# Patient Record
Sex: MALE | Race: OTHER | HISPANIC OR LATINO | URBAN - METROPOLITAN AREA
[De-identification: names, ages, dates, MRNs, and addresses within clinical notes are randomized per-mention and may not be internally consistent; named-entity substitution may affect disease eponyms.]

---

## 2019-08-13 ENCOUNTER — EMERGENCY (EMERGENCY)
Facility: HOSPITAL | Age: 29
LOS: 1 days | Discharge: HOME | End: 2019-08-13
Attending: EMERGENCY MEDICINE
Payer: COMMERCIAL

## 2019-08-13 VITALS
SYSTOLIC BLOOD PRESSURE: 123 MMHG | HEART RATE: 85 BPM | RESPIRATION RATE: 16 BRPM | OXYGEN SATURATION: 96 % | DIASTOLIC BLOOD PRESSURE: 76 MMHG

## 2019-08-13 VITALS
WEIGHT: 300.05 LBS | HEIGHT: 76 IN | HEART RATE: 78 BPM | OXYGEN SATURATION: 96 % | RESPIRATION RATE: 19 BRPM | TEMPERATURE: 98 F | DIASTOLIC BLOOD PRESSURE: 71 MMHG | SYSTOLIC BLOOD PRESSURE: 108 MMHG

## 2019-08-13 PROCEDURE — 99284 EMERGENCY DEPT VISIT MOD MDM: CPT | Mod: 25

## 2019-08-13 PROCEDURE — 96374 THER/PROPH/DIAG INJ IV PUSH: CPT | Mod: XU

## 2019-08-13 PROCEDURE — 73030 X-RAY EXAM OF SHOULDER: CPT | Mod: 26,RT,76

## 2019-08-13 PROCEDURE — 23650 CLTX SHO DSLC W/MNPJ WO ANES: CPT | Mod: 54

## 2019-08-13 PROCEDURE — 73030 X-RAY EXAM OF SHOULDER: CPT

## 2019-08-13 PROCEDURE — 96375 TX/PRO/DX INJ NEW DRUG ADDON: CPT | Mod: XU

## 2019-08-13 PROCEDURE — 23650 CLTX SHO DSLC W/MNPJ WO ANES: CPT | Mod: RT

## 2019-08-13 PROCEDURE — 99053 MED SERV 10PM-8AM 24 HR FAC: CPT

## 2019-08-13 RX ORDER — KETOROLAC TROMETHAMINE 30 MG/ML
15 SYRINGE (ML) INJECTION ONCE
Refills: 0 | Status: DISCONTINUED | OUTPATIENT
Start: 2019-08-13 | End: 2019-08-13

## 2019-08-13 RX ORDER — FENTANYL CITRATE 50 UG/ML
50 INJECTION INTRAVENOUS ONCE
Refills: 0 | Status: DISCONTINUED | OUTPATIENT
Start: 2019-08-13 | End: 2019-08-13

## 2019-08-13 RX ADMIN — FENTANYL CITRATE 50 MICROGRAM(S): 50 INJECTION INTRAVENOUS at 06:07

## 2019-08-13 RX ADMIN — Medication 15 MILLIGRAM(S): at 06:06

## 2019-08-13 NOTE — ED PROVIDER NOTE - NSFOLLOWUPCLINICS_GEN_ALL_ED_FT
[facility] Orthopedic Surgery  Orthopedic Surgery  [address], 3rd & 4th [place] [address]  Phone: [phone]  Fax:   Follow Up Time:

## 2019-08-13 NOTE — ED PROVIDER NOTE - CLINICAL SUMMARY MEDICAL DECISION MAKING FREE TEXT BOX
30 yo m pmh recurrent shoulder dislocations pw r shoulder pain and anterior deformity. pain control. xr. reduction. reassess.

## 2019-08-13 NOTE — ED ADULT NURSE NOTE - NSIMPLEMENTINTERV_GEN_ALL_ED
Implemented All Universal Safety Interventions:  [place] to call system. Call bell, personal items and telephone within reach. Instruct patient to call for assistance. Room bathroom lighting operational. Non-slip footwear when patient is off stretcher. Physically safe environment: no spills, clutter or unnecessary equipment. Stretcher in lowest position, wheels locked, appropriate side rails in place.

## 2019-08-13 NOTE — ED PROVIDER NOTE - NSFOLLOWUPINSTRUCTIONS_ED_ALL_ED_FT
Dislocation    A dislocation is a displacement of the bones that form a joint. This can result from trauma or due to a previous weakening of that joint. Symptoms include pain, swelling, and deformity at the site. Your health care provider has performed maneuvers to place the bones back in place. If a splint or brace was applied, make sure to wear it until you see an orthopedist as instructed.     SEEK IMMEDIATE MEDICAL CARE IF YOU HAVE ANY OF THE FOLLOWING SYMPTOMS: numbness, tingling, pain, weakness, or skin color/temperature change in any part of your body distal to the injury.

## 2019-08-13 NOTE — ED ADULT NURSE NOTE - OBJECTIVE STATEMENT
29 y.o M presents to the ED from home c/o dislocated shoulder. Patient is awake, alert and speaking coherently. As per patient he woke up at 4:34 am in pain and felt like he dislocated his right shoulder. Patient reports hx of right sided shoulder dislocation- last time being over 1 year ago. Additionally, patient reports numbness and tingling down the right arm and to the fingers; + pulses, pain 7/10. No medications taken prior to ED arrival.

## 2019-08-13 NOTE — ED PROVIDER NOTE - ATTENDING CONTRIBUTION TO CARE
29M presenting to the ED w/ R shoulder dislocation that occurred around 3:44AM while he was sleeping, states that he does not know how it happened but he woke up with it. States that he has moderate sharp pain R shoulder, exacerbated by movement and palpation, alleviated with rest. States that he has not taken anything for the pain. Reports that he has had multiple shoulder dislocations in the past but has never followed up with an orthopedic doctor because he has too many things going on in life. States that he has already attempted some techniques at home including [place] and manipulation. Reports mild tingling/paresthesias extending to R hand that started after he came to the ED.    ROS:  GEN: (-) fevers/chills, (-) weight loss, (-) fatigue/malaise  HEENT: (-) visual change  NECK: (-) stiffness  RESP: (-) shortness of breath, (-) cough  CV: (-) chest pain, (-) palpitations  GI: (-) nausea, (-) vomiting, (-) pain, (-) constipation, (-) diarrhea  EXT: (-) edema, (-) cyanosis, (+) shoulder pain  ENDO: (-) heat/cold intolerance, (-) polyuria  NEURO: (+) paresthesias, (-) weakness, (-) headache, (-) dizziness, (-) syncope  MSK: no recent trauma    PMHx: Recurrent R shoulder dislocation  PSHx: Denies  Allergies: NKDA  SocHx: Denies ETOH/drugs/smoking    Physical examination:  VITALS REVIEWED.  Hypertensive, otherwise normal  GENERALIZED APPEARANCE:  Uncomfortable appearing, ambulating without difficulty  SKIN:  Warm, dry, no cyanosis  HEAD:  No obvious scalp lesions  EYES:  Conjunctiva pink, no icterus  ENMT:  Mucus membranes moist, no stridor  NECK:  Supple, non-tender  CHEST AND RESPIRATORY:  Clear to auscultation B/L, good air entry B/L, equal chest expansion  HEART AND CARDIOVASCULAR:  Regular rate, no obvious murmur  ABDOMEN AND GI:  Soft, non-tender, non-distended.  No rebound, no guarding  EXTREMITIES: R shoulder with +deformity, anterior dislocated, ROM moderately limited 2/2 pain, radial pulse 2+, radial/median/ulnar nerve distributions intact sensory and motor, cap refill <2s  NEURO: AAOx3, gross motor and sensory intact     Impression:  R shoulder dislocation, no obvious fracture  XR, pain management, reduction, shoulder immobilizer, f/u with orthopedics enforced given recurrent shoulder dislocations/multiple in the past.

## 2019-08-13 NOTE — ED PROVIDER NOTE - PROGRESS NOTE DETAILS
Shoulder reduced, patient reports feeling better. Shoulder reduced, patient reports feeling better. Paresthesias resolved. Shoulder immobilizer placed. Strict orthopedic follow up encouraged. Patient states he has a [employer] this weekend, enforced that he should follow up with Orthopedics prior to engaging in any heavy activity due to risk of recurrent shoulder dislocation.

## 2019-08-13 NOTE — ED PROVIDER NOTE - NS ED ROS FT
GENERAL: No fever or chills, //             EYES: no change in vision, //             HEENT: no trouble swallowing or speaking, //             CARDIAC: no chest pain, //              PULMONARY: no cough or SOB, //             GI: no abdominal pain, no nausea or no vomiting, no diarrhea or constipation, //             : No changes in urination,  //            SKIN: no rashes,  //            NEURO: no headache,  //             MSK: r shoulder pain. ~Gold Gardner DO PGY1

## 2019-08-13 NOTE — ED PROVIDER NOTE - PHYSICAL EXAMINATION
General: well appearing, interactive, well nourished, no apparent distress  HEENT: EOMI, PERRLA, normal mucosa, normal oropharynx, no lesions on the lips or on oral mucosa, normal external ear  Neck: suppl  CV: RRR, normal S1 and S2 with no murmur, capillary refill less than two seconds  Resp: lungs CTA b/l, good aeration bilaterally, symmetric chest wall   Abd: non-distended, soft  : no CVA tenderness  MSK: r shoulder squared  Neuro: silt in all extremities  Skin: no rashes, skin intact

## 2019-08-13 NOTE — ED PROVIDER NOTE - OBJECTIVE STATEMENT
30 yo m pmh recurrent r shoulder dislocations pw r shoulder pain. states while sleeping believes r shoulder "popped out". states shoulder is painful, 10/10, sharp, localized, denies trauma, worse with movement, better with rest, has not taken anything for meds. denies n/t.

## 2019-08-13 NOTE — ED PROCEDURE NOTE - NS ED PERI NEURO NEG
Pre-application: Motor, sensory, and vascular responses intact in the injured extremity. Pre-application: Motor, sensory, and vascular responses intact in the injured extremity./Post-application: Motor, sensory, and vascular responses intact in the injured extremity./The patient/caregiver verbalized understanding of how to care for the injured extremity with splint

## 2025-06-09 NOTE — ED PROCEDURE NOTE - CPROC ED INFORMED CONSENT1
Benefits, risks, and possible complications of procedure explained to patient/caregiver who verbalized understanding and gave verbal consent. 09-Jun-2025 12:03